# Patient Record
Sex: MALE | Race: WHITE
[De-identification: names, ages, dates, MRNs, and addresses within clinical notes are randomized per-mention and may not be internally consistent; named-entity substitution may affect disease eponyms.]

---

## 2018-10-25 ENCOUNTER — HOSPITAL ENCOUNTER (OUTPATIENT)
Dept: HOSPITAL 76 - ED | Age: 68
Setting detail: OBSERVATION
LOS: 1 days | Discharge: HOME | End: 2018-10-26
Attending: SURGERY | Admitting: SURGERY
Payer: MEDICARE

## 2018-10-25 DIAGNOSIS — K21.9: ICD-10-CM

## 2018-10-25 DIAGNOSIS — Z87.891: ICD-10-CM

## 2018-10-25 DIAGNOSIS — K35.30: Primary | ICD-10-CM

## 2018-10-25 LAB
ALBUMIN DIAFP-MCNC: 4 G/DL (ref 3.2–5.5)
ALBUMIN/GLOB SERPL: 1.2 {RATIO} (ref 1–2.2)
ALP SERPL-CCNC: 31 IU/L (ref 42–121)
ALT SERPL W P-5'-P-CCNC: 22 IU/L (ref 10–60)
ANION GAP SERPL CALCULATED.4IONS-SCNC: 7 MMOL/L (ref 6–13)
AST SERPL W P-5'-P-CCNC: 20 IU/L (ref 10–42)
BASOPHILS NFR BLD AUTO: 0.1 10^3/UL (ref 0–0.1)
BASOPHILS NFR BLD AUTO: 1.4 %
BILIRUB BLD-MCNC: 0.4 MG/DL (ref 0.2–1)
BUN SERPL-MCNC: 22 MG/DL (ref 6–20)
CALCIUM UR-MCNC: 8.8 MG/DL (ref 8.5–10.3)
CHLORIDE SERPL-SCNC: 102 MMOL/L (ref 101–111)
CLARITY UR REFRACT.AUTO: CLEAR
CO2 SERPL-SCNC: 27 MMOL/L (ref 21–32)
CREAT SERPLBLD-SCNC: 1 MG/DL (ref 0.6–1.2)
EOSINOPHIL # BLD AUTO: 0.4 10^3/UL (ref 0–0.7)
EOSINOPHIL NFR BLD AUTO: 3.9 %
ERYTHROCYTE [DISTWIDTH] IN BLOOD BY AUTOMATED COUNT: 13.1 % (ref 12–15)
GFRSERPLBLD MDRD-ARVRAT: 75 ML/MIN/{1.73_M2} (ref 89–?)
GLOBULIN SER-MCNC: 3.3 G/DL (ref 2.1–4.2)
GLUCOSE SERPL-MCNC: 111 MG/DL (ref 70–100)
GLUCOSE UR QL STRIP.AUTO: NEGATIVE MG/DL
HGB UR QL STRIP: 13.7 G/DL (ref 14–18)
KETONES UR QL STRIP.AUTO: NEGATIVE MG/DL
LIPASE SERPL-CCNC: 34 U/L (ref 22–51)
LYMPHOCYTES # SPEC AUTO: 1.3 10^3/UL (ref 1.5–3.5)
LYMPHOCYTES NFR BLD AUTO: 12.8 %
MCH RBC QN AUTO: 30.1 PG (ref 27–31)
MCHC RBC AUTO-ENTMCNC: 34.1 G/DL (ref 32–36)
MCV RBC AUTO: 88.3 FL (ref 80–94)
MONOCYTES # BLD AUTO: 1 10^3/UL (ref 0–1)
MONOCYTES NFR BLD AUTO: 9.2 %
NEUTROPHILS # BLD AUTO: 7.6 10^3/UL (ref 1.5–6.6)
NEUTROPHILS # SNV AUTO: 10.5 X10^3/UL (ref 4.8–10.8)
NEUTROPHILS NFR BLD AUTO: 72.7 %
NITRITE UR QL STRIP.AUTO: NEGATIVE
PDW BLD AUTO: 7.7 FL (ref 7.4–11.4)
PH UR STRIP.AUTO: 6.5 PH (ref 5–7.5)
PLATELET # BLD: 201 10^3/UL (ref 130–450)
PROT SPEC-MCNC: 7.3 G/DL (ref 6.7–8.2)
PROT UR STRIP.AUTO-MCNC: NEGATIVE MG/DL
RBC # UR STRIP.AUTO: NEGATIVE /UL
RBC MAR: 4.54 10^6/UL (ref 4.7–6.1)
SODIUM SERPLBLD-SCNC: 136 MMOL/L (ref 135–145)
SP GR UR STRIP.AUTO: 1.01 (ref 1–1.03)
UROBILINOGEN UR QL STRIP.AUTO: (no result) E.U./DL
UROBILINOGEN UR STRIP.AUTO-MCNC: NEGATIVE MG/DL

## 2018-10-25 PROCEDURE — 87086 URINE CULTURE/COLONY COUNT: CPT

## 2018-10-25 PROCEDURE — 85025 COMPLETE CBC W/AUTO DIFF WBC: CPT

## 2018-10-25 PROCEDURE — 96375 TX/PRO/DX INJ NEW DRUG ADDON: CPT

## 2018-10-25 PROCEDURE — 83690 ASSAY OF LIPASE: CPT

## 2018-10-25 PROCEDURE — 80053 COMPREHEN METABOLIC PANEL: CPT

## 2018-10-25 PROCEDURE — 44970 LAPAROSCOPY APPENDECTOMY: CPT

## 2018-10-25 PROCEDURE — 81003 URINALYSIS AUTO W/O SCOPE: CPT

## 2018-10-25 PROCEDURE — 99284 EMERGENCY DEPT VISIT MOD MDM: CPT

## 2018-10-25 PROCEDURE — 96365 THER/PROPH/DIAG IV INF INIT: CPT

## 2018-10-25 PROCEDURE — 96367 TX/PROPH/DG ADDL SEQ IV INF: CPT

## 2018-10-25 PROCEDURE — 81001 URINALYSIS AUTO W/SCOPE: CPT

## 2018-10-25 PROCEDURE — 36415 COLL VENOUS BLD VENIPUNCTURE: CPT

## 2018-10-25 PROCEDURE — 74177 CT ABD & PELVIS W/CONTRAST: CPT

## 2018-10-25 PROCEDURE — 96361 HYDRATE IV INFUSION ADD-ON: CPT

## 2018-10-25 PROCEDURE — 99283 EMERGENCY DEPT VISIT LOW MDM: CPT

## 2018-10-25 PROCEDURE — 96366 THER/PROPH/DIAG IV INF ADDON: CPT

## 2018-10-25 NOTE — CT REPORT
Reason:  lower abd pain, possible divertic/appy

Procedure Date:  10/25/2018   

Accession Number:  [id number] / [id number]                    

Procedure:  CT  - Abdomen/Pelvis W/ CPT Code:  

 

FULL RESULT:

 

 

EXAM:

CT ABDOMEN AND PELVIS

 

EXAM DATE: 10/25/2018 10:45 PM.

 

CLINICAL HISTORY: Increasing central lower abdominal pain since 7 AM. 

Constipation.

 

COMPARISONS: None.

 

TECHNIQUE: Routine helical CT imaging was performed through the abdomen 

and pelvis. IV contrast: 100 mL Isovue 300. Enteric contrast: No. 

Reconstructions: Coronal and sagittal.

 

In accordance with CT protocol optimization, one or more of the following 

dose reduction techniques were utilized for this exam: automated exposure 

control, adjustment of mA and/or KV based on patient size, or use of 

iterative reconstructive technique.

 

FINDINGS:

ABDOMEN:

Liver: No significant abnormality.

 

Stomach/Distal Esophagus: No significant abnormality.

 

Gallbladder: No significant abnormality.

 

Bile Ducts: No significant abnormality.

 

Pancreas: No significant abnormality.

 

Spleen: No significant abnormality.

 

Kidneys: No suspicious solid appearing lesion. No hydronephrosis.

 

Adrenals: No significant abnormality.

 

Bowel: Mild reactive wall thickening of the base of the cecum. There is 

no bowel obstruction. There is average amount of retained colonic fecal 

material.

 

Appendix: Thickened dilated appendix measuring 15 mm. Moderate 

surrounding inflammatory change.

 

Lymph Nodes: No pathologically enlarged nodes.

 

Vasculature: Normal caliber aorta.

 

Fluid: No significant free fluid.

 

Abdominal Wall: No significant abnormality.

 

Other: No significant abnormality.

 

PELVIS:

Prostate and Seminal Vesicles: No significant abnormality.

 

Bladder: No significant abnormality.

 

Lymph Nodes: No pathologically enlarged nodes.

 

Fluid: No significant free fluid.

 

Other: There is a small fat-containing left-sided inguinal hernia.

 

BONES: No suspicious bony lesions. Moderate lower thoracic degenerative 

change. There is moderate degenerative change about the hip joints 

bilaterally.

 

LOWER CHEST: No significant consolidation or effusion.

IMPRESSION:

Acute appendicitis without evidence of perforation or abscess.

 

 

RADIA

## 2018-10-25 NOTE — ED PHYSICIAN DOCUMENTATION
PD HPI ABD PAIN





- Stated complaint


Stated Complaint: ABD PX





- Chief complaint


Chief Complaint: Abd Pain





- History obtained from


History obtained from: Patient





- History of Present Illness


Timing - onset: Today (onset this morning of lower abd pain and cramps, 

infraumbilical and then toward RLQ. Nausea without vomiting. Firm stool today; 

no diarrhea.)


Timing - duration: Days (1)


Timing - details: Gradual onset, Still present


Quality: Cramping, Aching, Pain


Location: Periumbilical, RLQ


Radiation: No: Lower back, Left flank, Right flank


Improved by: Laying still


Worsened by: Eating, Position, Palpation.  No: Breathing


Associated symptoms: Fever, Constipation, Loss of appetite.  No: Nausea, 

Vomiting, Diarrhea, Dysuria, Testicular pain


Similar symptoms before: Has not had sx before


Recently seen: Not recently seen





Review of Systems


Constitutional: denies: Fever, Chills, Myalgias


Nose: denies: Rhinorrhea / runny nose, Congestion


Throat: denies: Sore throat


Cardiac: denies: Chest pain / pressure


Respiratory: denies: Cough


GI: reports: Abdominal Pain, Nausea, Constipation (somewhat firm stools recently

the past few weeks.).  denies: Vomiting, Diarrhea (had episode of diarrhea about

3-4 weeks ago for a day, and then none since, with firm stools instead.)





PD PAST MEDICAL HISTORY





- Past Medical History


Past Medical History: Yes


Cardiovascular: High cholesterol


Respiratory: None


Endocrine/Autoimmune: None


GI: GERD


: None


Psych: None


Musculoskeletal: None


Derm: None





- Past Surgical History


Past Surgical History: Yes


Ortho: Shoulder arthroplasty


HEENT: Cataracts





- Present Medications


Home Medications: 


                                Ambulatory Orders











 Medication  Instructions  Recorded  Confirmed


 


Clomiphene Citrate 50 mg PO 10/25/18 














- Allergies


Allergies/Adverse Reactions: 


                                    Allergies











Allergy/AdvReac Type Severity Reaction Status Date / Time


 


Sulfa (Sulfonamide Allergy  Anaphylaxis Verified 10/25/18 18:39





Antibiotics)     














- Social History


Does the pt smoke?: No


Smoking Status: Never smoker


Does the pt drink ETOH?: Yes


ETOH Use: Wine


Does the pt have substance abuse?: No





- Family History


Family history: reports: Non contributory





- Immunizations


Immunizations are current?: Yes





- POLST


Patient has POLST: No





PD ED PE NORMAL





- Vitals


Vital signs reviewed: Yes





- General


General: Alert and oriented X 3, Well developed/nourished





- HEENT


HEENT: Moist mucous membranes, Pharynx benign





- Neck


Neck: Supple, no meningeal sign, No adenopathy





- Cardiac


Cardiac: RRR, No murmur





- Respiratory


Respiratory: Clear bilaterally





- Abdomen


Abdomen: Normal bowel sounds, Non distended, No organomegaly, Other (There is 

localized tenderness in the infraumbilical and right lower quadrant area with 

localized guarding and mild percussion tenderness.  There is no generalized 

rebound nor percussion tenderness.  Bowel sounds are present slightly 

hypoactive.  Upper abdomen is not tender.)





- Male 


Male : Deferred





- Rectal


Rectal: Deferred





- Back


Back: No CVA TTP





- Derm


Derm: Normal color, Warm and dry





- Extremities


Extremities: No tenderness to palpate, Normal ROM s pain, No edema, No calf 

tenderness / cord





- Neuro


Neuro: Alert and oriented X 3, No motor deficit, Normal speech





Results





- Vitals


Vitals: 


                               Vital Signs - 24 hr











  10/25/18 10/25/18 10/25/18





  18:35 20:17 21:50


 


Temperature 37.2 C  


 


Heart Rate 99 81 85


 


Respiratory 20 16 16





Rate   


 


Blood Pressure 157/92 H 160/80 H 162/88 H


 


O2 Saturation 99 100 97














  10/25/18





  23:42


 


Temperature 36.9 C


 


Heart Rate 88


 


Respiratory 16





Rate 


 


Blood Pressure 165/93 H


 


O2 Saturation 96








                                     Oxygen











O2 Source                      Room air

















- Labs


Labs: 


                                Laboratory Tests











  10/25/18 10/25/18 10/25/18





  19:00 19:46 19:46


 


WBC   10.5 


 


RBC   4.54 L 


 


Hgb   13.7 L 


 


Hct   40.0 L 


 


MCV   88.3 


 


MCH   30.1 


 


MCHC   34.1 


 


RDW   13.1 


 


Plt Count   201 


 


MPV   7.7 


 


Neut # (Auto)   7.6 H 


 


Lymph # (Auto)   1.3 L 


 


Mono # (Auto)   1.0 


 


Eos # (Auto)   0.4 


 


Baso # (Auto)   0.1 


 


Absolute Nucleated RBC   0.00 


 


Nucleated RBC %   0.0 


 


Sodium    136


 


Potassium    4.1


 


Chloride    102


 


Carbon Dioxide    27


 


Anion Gap    7.0


 


BUN    22 H


 


Creatinine    1.0


 


Estimated GFR (MDRD)    75 L


 


Glucose    111 H


 


Calcium    8.8


 


Total Bilirubin    0.4


 


AST    20


 


ALT    22


 


Alkaline Phosphatase    31 L


 


Total Protein    7.3


 


Albumin    4.0


 


Globulin    3.3


 


Albumin/Globulin Ratio    1.2


 


Lipase    34


 


Urine Color  YELLOW  


 


Urine Clarity  CLEAR  


 


Urine pH  6.5  


 


Ur Specific Gravity  1.015  


 


Urine Protein  NEGATIVE  


 


Urine Glucose (UA)  NEGATIVE  


 


Urine Ketones  NEGATIVE  


 


Urine Occult Blood  NEGATIVE  


 


Urine Nitrite  NEGATIVE  


 


Urine Bilirubin  NEGATIVE  


 


Urine Urobilinogen  0.2 (NORMAL)  


 


Ur Leukocyte Esterase  NEGATIVE  


 


Ur Microscopic Review  NOT INDICATED  


 


Urine Culture Comments  NOT INDICATED  














- Rads (name of study)


  ** abd CT


Radiology: Prelim report reviewed (acute appendicitis without perforation nor 

abscess)





PD MEDICAL DECISION MAKING





- ED course


Complexity details: reviewed results (acute appendicitis without perforation nor

abscess), considered differential, d/w patient, d/w consultant (I talked with 

Dr. Land who is on-call for surgery who will come in and see the patient 

and write orders.  His plan is to do surgery in the morning and so we will 

overnight the patient in the hospital.)





Departure





- Departure


Disposition: ED Transfer to \[facility]\""


Clinical Impression: 


Abdominal pain


Qualifiers:


 Abdominal location: right lower quadrant Qualified Code(s): R10.31 - Right 

lower quadrant pain





Appendicitis


Qualifiers:


 Appendicitis type: acute appendicitis Acute appendicitis type: with localized 

peritonitis Appendicitis gangrene presence: without gangrene Appendicitis 

perforation presence: without perforation Appendicitis abscess presence: without

abscess Qualified Code(s): K35.30 - Acute appendicitis with localized 

peritonitis, without perforation or gangrene





Condition: Stable


Record reviewed to determine appropriate education?: Yes

## 2018-10-26 VITALS — SYSTOLIC BLOOD PRESSURE: 149 MMHG | DIASTOLIC BLOOD PRESSURE: 86 MMHG

## 2018-10-26 PROCEDURE — 0DTJ4ZZ RESECTION OF APPENDIX, PERCUTANEOUS ENDOSCOPIC APPROACH: ICD-10-PCS | Performed by: SURGERY

## 2018-10-26 RX ADMIN — CEFOXITIN SODIUM SCH MLS/HR: 2 POWDER, FOR SOLUTION INTRAVENOUS at 00:54

## 2018-10-26 RX ADMIN — SODIUM CHLORIDE, PRESERVATIVE FREE SCH: 5 INJECTION INTRAVENOUS at 09:25

## 2018-10-26 RX ADMIN — SODIUM CHLORIDE, PRESERVATIVE FREE SCH ML: 5 INJECTION INTRAVENOUS at 00:53

## 2018-10-26 RX ADMIN — CEFOXITIN SODIUM SCH MLS/HR: 2 POWDER, FOR SOLUTION INTRAVENOUS at 06:42

## 2018-10-26 NOTE — ANESTHESIA
Pre-Anesthesia VS, & Labs





- Diagnosis





Acute appendicitis





- Procedure





Laparoscopic appendectomy


Vital Signs: 





                                        











Temp Pulse Resp BP Pulse Ox


 


 36.5 C   73   16   131/74 H  96 


 


 10/26/18 08:06  10/26/18 08:06  10/26/18 08:06  10/26/18 08:06  10/26/18 08:06














                                        





Height                           5 ft 11 in


Weight (kg)                      86.5 kg


Body Mass Index                  26.6











- NPO


>8 hours





- Lab Results


Current Lab Results: 





Laboratory Tests





10/25/18 19:46: Sodium 136, Potassium 4.1, Chloride 102, Carbon Dioxide 27, 

Anion Gap 7.0, BUN 22 H, Creatinine 1.0, Estimated GFR (MDRD) 75 L, Glucose 111 

H, Calcium 8.8, Total Bilirubin 0.4, AST 20, ALT 22, Alkaline Phosphatase 31 L, 

Total Protein 7.3, Albumin 4.0, Globulin 3.3, Albumin/Globulin Ratio 1.2, Lipase

34


10/25/18 19:46: WBC 10.5, RBC 4.54 L, Hgb 13.7 L, Hct 40.0 L, MCV 88.3, MCH 3

0.1, MCHC 34.1, RDW 13.1, Plt Count 201, MPV 7.7, Neut # (Auto) 7.6 H, Lymph # 

(Auto) 1.3 L, Mono # (Auto) 1.0, Eos # (Auto) 0.4, Baso # (Auto) 0.1, Absolute 

Nucleated RBC 0.00, Nucleated RBC % 0.0








Lab results reviewed: Yes


Fish Bones: 


                                 10/25/18 19:46





                                 10/25/18 19:46





Home Medications and Allergies


Home Medications: 


Ambulatory Orders





Clomiphene Citrate 50 mg PO 10/25/18 











Active Medications





Potassium Chloride/Dextrose/Sod Cl (D5.45ns W/20 Meq Kcl)  1,000 mls @ 100 

mls/hr IV .Q10H LISET


   Last Admin: 10/26/18 00:53 Dose:  100 mls/hr


Cefoxitin Sodium 2 gm/ Sodium (Chloride)  100 mls @ 100 mls/hr IV Q6H LISET


   Last Infusion: 10/26/18 07:49 Dose:  Infused


Morphine Sulfate (Morphine (Carpuject))  2 mg IVP Q2HR PRN


   PRN Reason: Pain 8 to 10


Sodium Chloride (Normal Saline Flush 0.9%)  10 ml IVP PRN PRN


   PRN Reason: AS NEEDED PER PROVIDER ORDERS


Sodium Chloride (Normal Saline Flush 0.9%)  10 ml IVP 0100,0900,1700 LISET


   Last Admin: 10/26/18 00:53 Dose:  10 ml





                                        





Clomiphene Citrate 50 mg PO 10/25/18 








Allergies/Adverse Reactions: 


                                    Allergies











Allergy/AdvReac Type Severity Reaction Status Date / Time


 


Sulfa (Sulfonamide Allergy  Anaphylaxis Verified 10/25/18 18:39





Antibiotics)     














Anes History & Medical History





- Anesthetic History


Anesthesia Complications: reports: No previous complications


Family history of Anesthesia Complications: Denies


Family history of Malignant Hyperthermia: Denies





- Medical History


Cardiovascular: reports: High cholesterol


Pulmonary: reports: None


Gastrointestinal: reports: GERD (Poorly controlled)


Urinary: reports: None


Neuro: reports: None


Musculoskeletal: reports: None


Endocrine/Autoimmune: reports: None


Blood Disorders: reports: None


Skin: reports: None


Smoking Status: Former smoker (Quit 25 years ago. 27 year pack history)


Psychosocial: reports: No issues indicated





- Surgical History


Eyes Ears Nose Throat (EENT): Cataracts


Orthopedic: Shoulder arthroplasty, Other (Trigger thumb, trigger finger and 

infected cat bites)





Exam


General: Alert, Oriented x3, Cooperative, No acute distress


Dental: WNL


Mouth Opening: 3 Fingerbreadth


Neck Mobility: Normal


Mallampati classification: II


Thyromental Distance: 4-6 cm


Respiratory: Lungs clear, Normal breath sounds, No respiratory distress, No acc

essory muscle use


Cardiovascular: Regular rate, Normal S1, Normal S2, No murmurs


Mental/Cognitive Status: Alert/Oriented X3, Normal for patient


Cognitive Status: Within normal limits





Plan


Anesthesia Type: General


Consent for Procedure(s) Verified and Reviewed: Yes


Code Status: Attempt Resuscitation


ASA classification: 2-Mild systemic disease


Is this case an emergency?: Yes

## 2018-10-26 NOTE — HISTORY & PHYSICAL EXAMINATION
HPI





- Admitted From


Admitted from: ED





- History Obtained From


History obtained from: Patient


Exam limitations: No limitations





- History of Present Illness


Severity at the worst: reports: Mild (1 day of RLQ abdominal pain)





PMH/PSH





- Past Medical History


Cardiovascular: positive: High cholesterol


Respiratory: positive: None


Endocrine/Autoimmune: positive: None


GI: positive: GERD


: positive: None


Psych: positive: None


Musculoskeletal: positive: None


Derm: positive: None


MRSA Hx?: No





- Past Surgical History


Ortho: positive: Shoulder arthroplasty


HEENT: positive: Cataracts





Social & Family Hx





- Social History


Does the pt smoke?: No


Smoking Status: Never smoker


Does the pt drink ETOH?: Yes


ETOH Use: Wine


Does the pt have substance abuse?: No





- POLST


Patient has POLST: No





Meds/Allgy





- Home Medications


Home Medications: 


                                Ambulatory Orders











 Medication  Instructions  Recorded  Confirmed


 


Clomiphene Citrate 50 mg PO 10/25/18 














- Allergies


Allergies/Adverse Reactions: 


                                    Allergies











Allergy/AdvReac Type Severity Reaction Status Date / Time


 


Sulfa (Sulfonamide Allergy  Anaphylaxis Verified 10/25/18 18:39





Antibiotics)     














Exam





- Vital Signs


Vital Signs: 





                                Vital Signs x48h











  Temp Pulse Resp BP Pulse Ox


 


 10/25/18 23:42  36.9 C  88  16  165/93 H  96


 


 10/25/18 21:50   85  16  162/88 H  97


 


 10/25/18 20:17   81  16  160/80 H  100


 


 10/25/18 18:35  37.2 C  99  20  157/92 H  99














- Physical Exam


General Appearance: positive: No acute distress


Eyes Bilateral: positive: Normal inspection


ENT: positive: ENT inspection nml


Neck: positive: Nml inspection


Respiratory: positive: Chest non-tender


Cardiovascular: positive: Regular rate & rhythm


Abdomen: positive: Tenderness


Back: positive: Nml inspection


Skin: positive: Color nml


Extremities: positive: Non-tender


Neurologic/Psychiatric: positive: Oriented x3





Results





- Lab Results


Fish Bones: 


                                 10/25/18 19:46





                                 10/25/18 19:46


Other Lab Results: 





                               Lab Results x24hrs











  10/25/18 10/25/18 10/25/18 Range/Units





  19:46 19:46 19:00 


 


WBC   10.5   (4.8-10.8)  x10^3/uL


 


RBC   4.54 L   (4.70-6.10)  10^6/uL


 


Hgb   13.7 L   (14.0-18.0)  g/dL


 


Hct   40.0 L   (42.0-52.0)  %


 


MCV   88.3   (80.0-94.0)  fL


 


MCH   30.1   (27.0-31.0)  pg


 


MCHC   34.1   (32.0-36.0)  g/dL


 


RDW   13.1   (12.0-15.0)  %


 


Plt Count   201   (130-450)  10^3/uL


 


MPV   7.7   (7.4-11.4)  fL


 


Neut # (Auto)   7.6 H   (1.5-6.6)  10^3/uL


 


Lymph # (Auto)   1.3 L   (1.5-3.5)  10^3/uL


 


Mono # (Auto)   1.0   (0.0-1.0)  10^3/uL


 


Eos # (Auto)   0.4   (0.0-0.7)  10^3/uL


 


Baso # (Auto)   0.1   (0.0-0.1)  10^3/uL


 


Absolute Nucleated RBC   0.00   x10^3/uL


 


Nucleated RBC %   0.0   /100WBC


 


Sodium  136    (135-145)  mmol/L


 


Potassium  4.1    (3.5-5.0)  mmol/L


 


Chloride  102    (101-111)  mmol/L


 


Carbon Dioxide  27    (21-32)  mmol/L


 


Anion Gap  7.0    (6-13)  


 


BUN  22 H    (6-20)  mg/dL


 


Creatinine  1.0    (0.6-1.2)  mg/dL


 


Estimated GFR (MDRD)  75 L    (>89)  


 


Glucose  111 H    ()  mg/dL


 


Calcium  8.8    (8.5-10.3)  mg/dL


 


Total Bilirubin  0.4    (0.2-1.0)  mg/dL


 


AST  20    (10-42)  IU/L


 


ALT  22    (10-60)  IU/L


 


Alkaline Phosphatase  31 L    ()  IU/L


 


Total Protein  7.3    (6.7-8.2)  g/dL


 


Albumin  4.0    (3.2-5.5)  g/dL


 


Globulin  3.3    (2.1-4.2)  g/dL


 


Albumin/Globulin Ratio  1.2    (1.0-2.2)  


 


Lipase  34    (22-51)  U/L


 


Urine Color    YELLOW  


 


Urine Clarity    CLEAR  (CLEAR)  


 


Urine pH    6.5  (5.0-7.5)  PH


 


Ur Specific Gravity    1.015  (1.002-1.030)  


 


Urine Protein    NEGATIVE  (NEGATIVE)  mg/dL


 


Urine Glucose (UA)    NEGATIVE  (NEGATIVE)  mg/dL


 


Urine Ketones    NEGATIVE  (NEGATIVE)  mg/dL


 


Urine Occult Blood    NEGATIVE  (NEGATIVE)  


 


Urine Nitrite    NEGATIVE  (NEGATIVE)  


 


Urine Bilirubin    NEGATIVE  (NEGATIVE)  


 


Urine Urobilinogen    0.2 (NORMAL)  (NORMAL)  E.U./dL


 


Ur Leukocyte Esterase    NEGATIVE  (NEGATIVE)  


 


Ur Microscopic Review    NOT INDICATED  


 


Urine Culture Comments    NOT INDICATED  














Impression/Plan





- Problem List


Problem List: 





69 yo otherwise healthy man with acute appendicitis.  He is stable and 

comfortable.  Plan to admit on IVF and antibiotics for appendectomy in the AM.

## 2018-10-26 NOTE — OPERATIVE REPORT
DATE OF SERVICE: 10/26/2018

Physician: Davey Elam MD

 

PREOPERATIVE DIAGNOSIS:  Acute appendicitis.

 

POSTOPERATIVE DIAGNOSIS:  Acute appendicitis.

 

NAME OF PROCEDURE:  Laparoscopic appendectomy.

 

SURGEON:  Davey Elam MD.

 

ANESTHESIA:

 

INDICATIONS FOR PROCEDURE:  The patient is a 68-year-old man who presented to 
the ER early this morning, complaining of 1 day of right lower quadrant pain.  
He was found to have acute appendicitis.

 

PROCEDURE IN DETAIL:  The risks and benefits were explained to the patient.  He 
agreed to the procedure.  He was taken to the operating room, placed under 
general anesthesia, and intubated.  The abdomen was prepped and draped.  A 
timeout was performed.  Everyone in the room agreed to the procedure.  We began 
by making a 12-mm transumbilical incision.  We carried this down through a 
natural hernia defect into the peritoneal cavity.  We inserted a Mitesh trocar 
and secured in place and then insufflated the abdomen to 15 mmHg, then inserted 
two 5-mm trocars under vision with the camera, 1 above the pubic symphysis and 1
in the left lower quadrant.  We identified the appendix, which was moderately 
inflamed, created a window at the base of the appendix, between the mesoappendix
and the appendiceal body, then used the NORMA stapler, a blue load, to transect 
the body and a white load to transect the mesoappendix.  The appendix was then 
placed in an EndoCatch bag and sent to pathology.  The staple lines were 
examined and found to be flush with the cecum and free of bleeding.  All 3 
trocars were then removed under vision of the camera.  The fascia of the 
umbilical port site was closed, using 0 prolene stitch, and the skin of all 3 
incisions closed, using 4-0 Monocryl and Dermabond.  Local anesthetic was 
infused prior to closure.  This terminated the procedure.  The patient tolerated
well and was extubated in the operating room and taken to recovery room in 
stable condition.  The instrument and lap counts were correct.  Dr. Elam 
was present throughout the entire procedure.

 

ESTIMATED BLOOD LOSS:  5 mL.

 

SPECIMEN:  Appendix.

 

COMPLICATIONS:  None.

 

PLAN:  For the patient to go home from recovery.

 

 

DD: 10/26/2018 10:28

TD: 10/26/2018 10:34

Job #: [id number]

[facility]

## 2018-10-28 ENCOUNTER — HOSPITAL ENCOUNTER (EMERGENCY)
Dept: HOSPITAL 76 - ED | Age: 68
Discharge: HOME | End: 2018-10-28
Payer: MEDICARE

## 2018-10-28 VITALS — DIASTOLIC BLOOD PRESSURE: 84 MMHG | SYSTOLIC BLOOD PRESSURE: 147 MMHG

## 2018-10-28 DIAGNOSIS — T81.40XA: ICD-10-CM

## 2018-10-28 DIAGNOSIS — Z87.891: ICD-10-CM

## 2018-10-28 DIAGNOSIS — L03.316: Primary | ICD-10-CM

## 2018-10-28 PROCEDURE — 99283 EMERGENCY DEPT VISIT LOW MDM: CPT

## 2018-10-28 NOTE — ED PHYSICIAN DOCUMENTATION
History of Present Illness





- Stated complaint


Stated Complaint: UMBILICAL WOUND INFECTION





- Chief complaint


Chief Complaint: General





- History obtained from


History obtained from: Patient





- History of Present Illness


Timing: Yesterday


Pain level max: 3


Pain level now: 3





- Additonal information


Additional information: 





Patient is a 68-year-old male who is 2 days status post a laparoscopic 

appendectomy.  States he had yellow/bloody drainage from his umbilicus earlier 

today and is noticed redness spreading from the umbilicus.  Concern for 

potential infection.  No fevers.  Nothing makes it better or worse.  He 

contacted the surgeon on-call who recommended coming here for evaluation





Review of Systems


Constitutional: denies: Fever, Chills


GI: denies: Vomiting


Musculoskeletal: denies: Neck pain, Back pain





PD PAST MEDICAL HISTORY





- Past Medical History


Cardiovascular: High cholesterol


Respiratory: None


Neuro: None


Endocrine/Autoimmune: None


GI: GERD (Poorly controlled)


: None


Psych: None


Musculoskeletal: None


Derm: None





- Past Surgical History


Past Surgical History: Yes


Ortho: Shoulder arthroplasty, Other (Trigger thumb, trigger finger and infected 

cat bites)


HEENT: Cataracts





- Present Medications


Home Medications: 


                                Ambulatory Orders











 Medication  Instructions  Recorded  Confirmed


 


Clomiphene Citrate 50 mg PO MOWEFR@0900 10/25/18 10/26/18


 


Clomiphene Citrate 25 mg PO SUTUTHSA@0900 10/26/18 10/26/18


 


Loratadine 10 mg PO DAILY 10/26/18 10/26/18


 


Cephalexin [Keflex] 500 mg PO Q6H #28 capsule 10/28/18 














- Allergies


Allergies/Adverse Reactions: 


                                    Allergies











Allergy/AdvReac Type Severity Reaction Status Date / Time


 


Sulfa (Sulfonamide Allergy  Anaphylaxis Verified 10/25/18 18:39





Antibiotics)     














- Social History


Does the pt smoke?: No


Smoking Status: Former smoker (Quit 25 years ago. 27 year pack history)


Does the pt drink ETOH?: Yes


Does the pt have substance abuse?: No





- Immunizations


Immunizations are current?: Yes





- POLST


Patient has POLST: No





PD ED PE NORMAL





- Vitals


Vital signs reviewed: Yes





- General


General: Alert and oriented X 3, No acute distress





- Abdomen


Abdomen: Soft, Other (Incisions are clean dry and intact with the exception of 

the umbilicus which has what appears to be serosanguineous drainage, however 

there is approximately 1 cm ring of erythema around the entire umbilicus.  

Mildly warm and tender.)





- Derm


Derm: Warm and dry





- Neuro


Neuro: Alert and oriented X 3





- Psych


Psych: Normal mood, Normal affect





Results





- Vitals


Vitals: 


                               Vital Signs - 24 hr











  10/28/18 10/28/18





  05:22 05:58


 


Temperature 36.8 C 36.2 C L


 


Heart Rate 73 86


 


Respiratory 18 16





Rate  


 


Blood Pressure 181/92 H 147/84 H


 


O2 Saturation 98 98








                                     Oxygen











O2 Source                      Room air

















PD MEDICAL DECISION MAKING





- ED course


Complexity details: considered differential, d/w patient


ED course: 





Patient is a 68-year-old male who appears to have cellulitis of the umbilical 

trocar incision.  Will place on Keflex and follow-up closely with his surgeon.  

He is well-appearing, nontoxic.  Afebrile.  Patient counseled regarding signs 

and symptoms for which I believe and urgent re-evaluation would be necessary. 

Patient with good understanding of and agreement to plan and is comfortable 

going home at this time





This document was made in part using voice recognition software. While efforts 

are made to proofread this document, sound alike and grammatical errors may 

occur.





Departure





- Departure


Disposition: 01 Home, Self Care


Clinical Impression: 


Cellulitis


Qualifiers:


 Site of cellulitis: trunk Site of cellulitis of trunk: umbilicus Qualified 

Code(s): L03.316 - Cellulitis of umbilicus





Condition: Good


Instructions:  ED Infec Skin Cellulitis


Follow-Up: 


Alberto Leone MD [Provider Admit Priv/Credential] - Within 3 Days (for wound 

check)


Prescriptions: 


Cephalexin [Keflex] 500 mg PO Q6H #28 capsule


Comments: 


Take all antibiotics until gone.  Return if you worsen.  Return especially for 

fevers or worsening redness.


Discharge Date/Time: 10/28/18 06:00

## 2018-11-09 NOTE — DISCHARGE SUMMARY
Discharge Summary


Admit Date: 10/26/18


Discharge Date: 10/26/18


Discharging Provider: sally


Code Status: Attempt Resuscitation


Condition at Discharge: Good


Discharge Disposition: 01 Home, Self Care





- DIAGNOSES


Admission Diagnoses: 





acute appendicitis


Discharge Diagnoses with Status of Each Condition: 





appendicitis successfully treated with an appendectomy





- HPI


History of Present Illness: 





69 yo man presented with acute appendicitis





- HOSPITAL COURSE


Hospital Course: 





He underwent an appendectomy and was discharged from recovery.





- ALLERGIES


Allergies/Adverse Reactions: 


                                    Allergies











Allergy/AdvReac Type Severity Reaction Status Date / Time


 


Sulfa (Sulfonamide Allergy  Anaphylaxis Verified 10/25/18 18:39





Antibiotics)     














- MEDICATIONS


Home Medications: 


                                Ambulatory Orders











 Medication  Instructions  Recorded  Confirmed


 


Clomiphene Citrate 50 mg PO MOWEFR@0900 10/25/18 10/26/18


 


Clomiphene Citrate 25 mg PO SUTUTHSA@0900 10/26/18 10/26/18


 


Loratadine 10 mg PO DAILY 10/26/18 10/26/18


 


Cephalexin [Keflex] 500 mg PO Q6H #28 capsule 10/28/18 














- PHYSICAL EXAM AT DISCHARGE


General Appearance: positive: No acute distress


Eyes Bilateral: positive: Normal inspection


ENT: positive: ENT inspection nml


Neck: positive: Nml inspection


Respiratory: positive: Chest non-tender


Cardiovascular: positive: Regular rate & rhythm


Abdomen: positive: Other (incisions c/d/i)


Back: positive: Nml inspection


Skin: positive: Color nml


Extremities: positive: Non-tender


Neurologic/Psychiatric: positive: Oriented x3





- LABS


Result Diagrams: 


                                 10/25/18 19:46





                                 10/25/18 19:46





- FOLLOW UP


Follow Up: 





1 week surgery clinic